# Patient Record
Sex: MALE | Race: BLACK OR AFRICAN AMERICAN | URBAN - METROPOLITAN AREA
[De-identification: names, ages, dates, MRNs, and addresses within clinical notes are randomized per-mention and may not be internally consistent; named-entity substitution may affect disease eponyms.]

---

## 2017-07-21 ENCOUNTER — RADIANT APPOINTMENT (OUTPATIENT)
Dept: GENERAL RADIOLOGY | Facility: CLINIC | Age: 32
End: 2017-07-21
Attending: PHYSICIAN ASSISTANT
Payer: COMMERCIAL

## 2017-07-21 ENCOUNTER — OFFICE VISIT (OUTPATIENT)
Dept: URGENT CARE | Facility: URGENT CARE | Age: 32
End: 2017-07-21
Payer: COMMERCIAL

## 2017-07-21 VITALS
TEMPERATURE: 97.9 F | WEIGHT: 191.2 LBS | DIASTOLIC BLOOD PRESSURE: 75 MMHG | OXYGEN SATURATION: 96 % | HEART RATE: 54 BPM | SYSTOLIC BLOOD PRESSURE: 117 MMHG

## 2017-07-21 DIAGNOSIS — M25.512 ACUTE PAIN OF LEFT SHOULDER: Primary | ICD-10-CM

## 2017-07-21 DIAGNOSIS — M25.512 ACUTE PAIN OF LEFT SHOULDER: ICD-10-CM

## 2017-07-21 DIAGNOSIS — M75.42 IMPINGEMENT SYNDROME OF LEFT SHOULDER: ICD-10-CM

## 2017-07-21 PROCEDURE — 99214 OFFICE O/P EST MOD 30 MIN: CPT | Performed by: PHYSICIAN ASSISTANT

## 2017-07-21 PROCEDURE — 73030 X-RAY EXAM OF SHOULDER: CPT | Mod: LT

## 2017-07-21 RX ORDER — CYCLOBENZAPRINE HCL 10 MG
10 TABLET ORAL
Qty: 14 TABLET | Refills: 1 | Status: SHIPPED | OUTPATIENT
Start: 2017-07-21

## 2017-07-21 RX ORDER — NAPROXEN 500 MG/1
500 TABLET ORAL 2 TIMES DAILY PRN
Qty: 30 TABLET | Refills: 1 | Status: SHIPPED | OUTPATIENT
Start: 2017-07-21

## 2017-07-21 ASSESSMENT — PAIN SCALES - GENERAL: PAINLEVEL: EXTREME PAIN (8)

## 2017-07-21 NOTE — MR AVS SNAPSHOT
After Visit Summary   7/21/2017    Al Hemphill    MRN: [id number]           Patient Information     Date Of Birth          1985        Visit Information        Provider Department      7/21/2017 3:40 PM Mirtha Juan PA-C [facility]        Today's Diagnoses     Acute pain of left shoulder    -  1    Impingement syndrome of left shoulder           Follow-ups after your visit        Additional Services     ORTHOPEDICS ADULT REFERRAL       Your provider has referred you to: [facility]: [facility] [phone]    [email or web address]    Please be aware that coverage of these services is subject to the terms and limitations of your health insurance plan.  Call member services at your health plan with any benefit or coverage questions.      Please bring the following to your appointment:    >>   Any x-rays, CTs or MRIs which have been performed.  Contact the facility where they were done to arrange for  prior to your scheduled appointment.  Any new CT, MRI or other procedures ordered by your specialist must be performed at a [facility] facility or coordinated by your clinic's referral office.    >>   List of current medications   >>   This referral request   >>   Any documents/labs given to you for this referral                  Who to contact     If you have questions or need follow up information about today's clinic visit or your schedule please contact [facility] directly at [phone].  Normal or non-critical lab and imaging results will be communicated to you by MyChart, letter or phone within 4 business days after the clinic has received the results. If you do not hear from us within 7 days, please contact the clinic through MyChart or phone. If you have a critical or abnormal lab result, we will notify you by phone as soon as possible.  Submit refill requests through MyChart or call  "your pharmacy and they will forward the refill request to us. Please allow 3 business days for your refill to be completed.          Additional Information About Your Visit        MyChart Information     [facility] lets you send messages to your doctor, view your test results, renew your prescriptions, schedule appointments and more. To sign up, go to [email or web address] . Click on \"Log in\" on the left side of the screen, which will take you to the Welcome page. Then click on \"Sign up Now\" on the right side of the page.     You will be asked to enter the access code listed below, as well as some personal information. Please follow the directions to create your username and password.     Your access code is: [email or web address]  Expires: 10/19/2017  5:50 PM     Your access code will  in 90 days. If you need help or a new code, please call your [place] clinic or [phone].        Care EveryWhere ID     This is your Care EveryWhere ID. This could be used by other organizations to access your [facility] medical records  [id number]        Your Vitals Were     Pulse Temperature Pulse Oximetry             54 97.9  F (36.6  C) (Oral) 96%          Blood Pressure from Last 3 Encounters:   17 117/75    Weight from Last 3 Encounters:   17 191 lb 3.2 oz (86.7 kg)              We Performed the Following     ORTHOPEDICS ADULT REFERRAL          Today's Medication Changes          These changes are accurate as of: 17  5:50 PM.  If you have any questions, ask your nurse or doctor.               Start taking these medicines.        Dose/Directions    cyclobenzaprine 10 MG tablet   Commonly known as:  FLEXERIL   Used for:  Acute pain of left shoulder   Started by:  Mirtha Juan PA-C        Dose:  10 mg   Take 1 tablet (10 mg) by mouth nightly as needed for muscle spasms   Quantity:  14 tablet   Refills:  1       naproxen 500 MG tablet   Commonly known as:  NAPROSYN   Used for:  Acute pain of left shoulder "   Started by:  Mirtha Juan PA-C        Dose:  500 mg   Take 1 tablet (500 mg) by mouth 2 times daily as needed for moderate pain   Quantity:  30 tablet   Refills:  1            Where to get your medicines      These medications were sent to [facility] - [place], [place] - [address]  [address], [place] [address]     Phone:  [phone]     cyclobenzaprine 10 MG tablet    naproxen 500 MG tablet                Primary Care Provider    None Specified       No primary provider on file.        Equal Access to Services     [facility]: Hadii aad ku hadasho Soomaali, waaxda luqadaha, qaybta kaalmada adeegyada, waxay idiin hayjatin flores . So [facility] [phone].    ATENCIÓN: Si habla español, tiene a walton disposición servicios gratuitos de asistencia lingüística. Llame al [phone].    We comply with applicable federal civil rights laws and [place] laws. We do not discriminate on the basis of race, color, national origin, age, disability sex, sexual orientation or gender identity.            Thank you!     Thank you for choosing [facility]  for your care. Our goal is always to provide you with excellent care. Hearing back from our patients is one way we can continue to improve our services. Please take a few minutes to complete the written survey that you may receive in the mail after your visit with us. Thank you!             Your Updated Medication List - Protect others around you: Learn how to safely use, store and throw away your medicines at www.disposemymeds.org.          This list is accurate as of: 7/21/17  5:50 PM.  Always use your most recent med list.                   Brand Name Dispense Instructions for use Diagnosis    cyclobenzaprine 10 MG tablet    FLEXERIL    14 tablet    Take 1 tablet (10 mg) by mouth nightly as needed for muscle spasms    Acute pain of left shoulder       naproxen 500 MG tablet    NAPROSYN    30 tablet     Take 1 tablet (500 mg) by mouth 2 times daily as needed for moderate pain    Acute pain of left shoulder

## 2017-07-21 NOTE — NURSING NOTE
Chief Complaint   Patient presents with     Shoulder Pain     left side neck       Initial /75 (BP Location: Left arm, Patient Position: Chair, Cuff Size: Adult Regular)  Pulse 54  Temp 97.9  F (36.6  C) (Oral)  Wt 191 lb 3.2 oz (86.7 kg)  SpO2 96% There is no height or weight on file to calculate BMI.  Medication Reconciliation: complete       Mila Campbell

## 2017-07-21 NOTE — PROGRESS NOTES
SUBJECTIVE:                                                      SUBJECTIVE:                                                    Al Hemphill is a 32 year old male who presents to clinic today for the following health issues:      Shoulder pain      Duration: 1 week, feels like something catches in the joint with movement. No injury. Does lift weights    Description (location/character/radiation): radiates to left side of neck    Intensity:  8/10    Accompanying signs and symptoms: shoulder and neck pain    History (similar episodes/previous evaluation): None    Precipitating or alleviating factors: None    Therapies tried and outcome: ibuprofen       REVIEW OF SYSTEMS:  CONSTITUTIONAL:NEGATIVE for fever, chills, change in weight  INTEGUMENTARY/SKIN: NEGATIVE for worrisome rashes, moles or lesions  MUSCULOSKELETAL:See HPI above  NEURO: NEGATIVE for weakness, dizziness or paresthesias    OBJECTIVE:  Blood pressure 117/75, pulse 54, temperature 97.9  F (36.6  C), temperature source Oral, weight 191 lb 3.2 oz (86.7 kg), SpO2 96 %.     GENERAL: healthy, alert and no distress  GAIT: normal   SKIN: no suspicious lesions or rashes  NEURO: Normal strength and tone, mentation intact and speech normal  PSYCH:  mentation appears normal and affect normal/bright    MUSCULOSKELETAL:  LEFT SHOULDER  Inspection: no swelling, bruising, discoloration, or obvious deformity or asymmetry. Left trap with spasm.  Tender: AC joint, subchondral area  Range of Motion- decreased active ROM past 90 degrees with ff, abd. Decreased ER to L3  Special tests:  Positive: Louis and Palencia   Positive painful arc  Neck- NT with FROM  xrays- I see no abnormalities    Neurovascularly Intact Distally.      ASSESSMENT    ICD-10-CM    1. Acute pain of left shoulder M25.512 XR Shoulder Left 2 Views     cyclobenzaprine (FLEXERIL) 10 MG tablet     naproxen (NAPROSYN) 500 MG tablet     ORTHOPEDICS ADULT REFERRAL   2. Impingement syndrome of left shoulder  M75.42 ORTHOPEDICS ADULT REFERRAL         PLAN:   Ice. See Ortho. Meds as above.  Mirtha Juan PA-C

## 2022-05-24 ENCOUNTER — TRANSCRIBE ORDERS (OUTPATIENT)
Dept: OTHER | Age: 37
End: 2022-05-24
Payer: COMMERCIAL

## 2022-05-24 DIAGNOSIS — M54.10 RADICULAR LEG PAIN: Primary | ICD-10-CM
